# Patient Record
Sex: FEMALE | Race: WHITE | NOT HISPANIC OR LATINO | URBAN - METROPOLITAN AREA
[De-identification: names, ages, dates, MRNs, and addresses within clinical notes are randomized per-mention and may not be internally consistent; named-entity substitution may affect disease eponyms.]

---

## 2024-02-06 ENCOUNTER — OFFICE VISIT (OUTPATIENT)
Dept: PRIMARY CARE | Facility: CLINIC | Age: 64
End: 2024-02-06
Payer: COMMERCIAL

## 2024-02-06 VITALS
BODY MASS INDEX: 22.96 KG/M2 | DIASTOLIC BLOOD PRESSURE: 84 MMHG | SYSTOLIC BLOOD PRESSURE: 124 MMHG | RESPIRATION RATE: 16 BRPM | TEMPERATURE: 98.3 F | WEIGHT: 138 LBS | OXYGEN SATURATION: 99 % | HEART RATE: 77 BPM

## 2024-02-06 DIAGNOSIS — H61.23 BILATERAL IMPACTED CERUMEN: ICD-10-CM

## 2024-02-06 DIAGNOSIS — B96.89 ACUTE BACTERIAL SINUSITIS: Primary | ICD-10-CM

## 2024-02-06 DIAGNOSIS — Z79.2 PROPHYLACTIC ANTIBIOTIC: ICD-10-CM

## 2024-02-06 DIAGNOSIS — J01.90 ACUTE BACTERIAL SINUSITIS: Primary | ICD-10-CM

## 2024-02-06 PROCEDURE — 69209 REMOVE IMPACTED EAR WAX UNI: CPT | Performed by: NURSE PRACTITIONER

## 2024-02-06 PROCEDURE — 1036F TOBACCO NON-USER: CPT | Performed by: NURSE PRACTITIONER

## 2024-02-06 PROCEDURE — 99213 OFFICE O/P EST LOW 20 MIN: CPT | Performed by: NURSE PRACTITIONER

## 2024-02-06 RX ORDER — OFLOXACIN 3 MG/ML
5 SOLUTION AURICULAR (OTIC) 2 TIMES DAILY
Qty: 5 ML | Refills: 0 | Status: SHIPPED | OUTPATIENT
Start: 2024-02-06 | End: 2024-02-13

## 2024-02-06 RX ORDER — AZITHROMYCIN 250 MG/1
TABLET, FILM COATED ORAL
Qty: 6 TABLET | Refills: 0 | Status: SHIPPED | OUTPATIENT
Start: 2024-02-06 | End: 2024-02-11

## 2024-02-06 ASSESSMENT — ENCOUNTER SYMPTOMS
HEADACHES: 1
SINUS PRESSURE: 1

## 2024-02-06 NOTE — PROGRESS NOTES
Subjective   Patient ID: Marlen Dasilva is a 63 y.o. female who presents for Sinusitis.  Sinusitis  Episode onset: 10 days. Associated symptoms include congestion, headaches and sinus pressure.    Review of Systems   HENT:  Positive for congestion and sinus pressure.    Neurological:  Positive for headaches.   Objective   /84   Pulse 77   Temp 36.8 °C (98.3 °F)   Resp 16   Wt 62.6 kg (138 lb)   SpO2 99%   BMI 22.96 kg/m²   Patient ID: Marlen Dasilva is a 63 y.o. female.  Ear Cerumen Removal    Date/Time: 2/6/2024 11:52 AM    Performed by: SALAZAR Lopez  Authorized by: SALAZAR Lopez    Consent:     Consent obtained:  Verbal    Risks, benefits, and alternatives were discussed: yes      Alternatives discussed:  No treatment  Universal protocol:     Procedure explained and questions answered to patient or proxy's satisfaction: yes      Patient identity confirmed:  Verbally with patient  Procedure details:     Location:  L ear and R ear    Procedure type: irrigation    Post-procedure details:     Inspection:  Ear canal clear, bleeding and TM intact (small amount of bright red blood noted on right TM. No apparent perforation. Had discomfort on right during procedure that resolved after it was finished.)    Hearing quality:  Normal    Procedure completion:  Tolerated with difficulty  Physical Exam  Vitals reviewed.   Constitutional:       Appearance: Normal appearance.   HENT:      Head: Normocephalic.   Eyes:      Conjunctiva/sclera: Conjunctivae normal.   Cardiovascular:      Rate and Rhythm: Normal rate and regular rhythm.      Pulses: Normal pulses.   Pulmonary:      Breath sounds: Normal breath sounds.   Abdominal:      General: Bowel sounds are normal.      Palpations: Abdomen is soft.   Musculoskeletal:      Cervical back: Neck supple.      Right lower leg: No edema.      Left lower leg: No edema.   Skin:     General: Skin is warm and dry.   Neurological:      General: No focal  deficit present.      Mental Status: She is alert.   Psychiatric:         Mood and Affect: Mood normal.         Thought Content: Thought content normal.     Assessment/Plan   1. Acute bacterial sinusitis  azithromycin (Zithromax) 250 mg tablet      2. Bilateral impacted cerumen        3. Prophylactic antibiotic  ofloxacin (Floxin) 0.3 % otic solution    Ofloxacin otic drops ordered due to small amount of blood noted after right ear irrigation.      Increase oral fluids/water, at least eight 8-oz glasses/day.  Get plenty of rest.  Cepacol lozenges and/or Chloraseptic spray PRN for sore throat. Salt water gargle or broth for comfort.  Alternate Tylenol/Ibuprofen PRN for body aches and/or fever  Saline nasal spray PRN for rhinitis.  Guaifenessin/Guaifenissin DM PRN for cough  Flonase nasal spray.    Follow-up as needed.

## 2024-04-29 PROBLEM — R06.83 SNORING: Status: ACTIVE | Noted: 2024-04-29

## 2024-04-29 PROBLEM — R06.81 WITNESSED APNEIC SPELLS: Status: ACTIVE | Noted: 2024-04-29

## 2024-04-29 PROBLEM — R09.81 NASAL CONGESTION: Status: ACTIVE | Noted: 2024-04-29

## 2024-04-29 PROBLEM — E78.5 HLD (HYPERLIPIDEMIA): Status: ACTIVE | Noted: 2024-04-29

## 2024-04-29 PROBLEM — M47.812 CERVICAL SPONDYLOSIS WITHOUT MYELOPATHY: Status: ACTIVE | Noted: 2018-06-19

## 2024-04-29 PROBLEM — L90.0 LICHEN SCLEROSUS ET ATROPHICUS: Status: ACTIVE | Noted: 2023-04-20

## 2024-04-29 PROBLEM — H90.3 BILATERAL SENSORINEURAL HEARING LOSS: Status: ACTIVE | Noted: 2024-04-29

## 2024-04-29 PROBLEM — G47.00 INSOMNIA: Status: ACTIVE | Noted: 2024-04-29

## 2024-04-29 PROBLEM — G47.33 OBSTRUCTIVE SLEEP APNEA (ADULT) (PEDIATRIC): Status: ACTIVE | Noted: 2023-05-16

## 2024-04-29 RX ORDER — ACETAMINOPHEN 500 MG
5000 TABLET ORAL DAILY
COMMUNITY

## 2024-04-29 RX ORDER — GLUCOSAM/CHONDRO/HERB 149/HYAL 750-100 MG
TABLET ORAL
COMMUNITY

## 2024-04-29 RX ORDER — BISMUTH SUBSALICYLATE 262 MG
1 TABLET,CHEWABLE ORAL DAILY
COMMUNITY

## 2024-04-30 ENCOUNTER — OFFICE VISIT (OUTPATIENT)
Dept: PRIMARY CARE | Facility: CLINIC | Age: 64
End: 2024-04-30
Payer: COMMERCIAL

## 2024-04-30 ENCOUNTER — LAB (OUTPATIENT)
Dept: LAB | Facility: LAB | Age: 64
End: 2024-04-30
Payer: COMMERCIAL

## 2024-04-30 VITALS
BODY MASS INDEX: 23.32 KG/M2 | WEIGHT: 140 LBS | HEART RATE: 70 BPM | DIASTOLIC BLOOD PRESSURE: 78 MMHG | RESPIRATION RATE: 16 BRPM | SYSTOLIC BLOOD PRESSURE: 120 MMHG | OXYGEN SATURATION: 100 % | TEMPERATURE: 98 F | HEIGHT: 65 IN

## 2024-04-30 DIAGNOSIS — Z12.31 ENCOUNTER FOR SCREENING MAMMOGRAM FOR MALIGNANT NEOPLASM OF BREAST: Primary | ICD-10-CM

## 2024-04-30 DIAGNOSIS — Z00.00 HEALTH CARE MAINTENANCE: ICD-10-CM

## 2024-04-30 PROBLEM — R09.81 NASAL CONGESTION: Status: RESOLVED | Noted: 2024-04-29 | Resolved: 2024-04-30

## 2024-04-30 PROBLEM — R06.83 SNORING: Status: RESOLVED | Noted: 2024-04-29 | Resolved: 2024-04-30

## 2024-04-30 PROBLEM — R06.81 WITNESSED APNEIC SPELLS: Status: RESOLVED | Noted: 2024-04-29 | Resolved: 2024-04-30

## 2024-04-30 LAB
25(OH)D3 SERPL-MCNC: 64 NG/ML (ref 30–100)
ALBUMIN SERPL BCP-MCNC: 4.7 G/DL (ref 3.4–5)
ALP SERPL-CCNC: 59 U/L (ref 33–136)
ALT SERPL W P-5'-P-CCNC: 16 U/L (ref 7–45)
ANION GAP SERPL CALC-SCNC: 10 MMOL/L (ref 10–20)
AST SERPL W P-5'-P-CCNC: 16 U/L (ref 9–39)
BILIRUB SERPL-MCNC: 0.9 MG/DL (ref 0–1.2)
BUN SERPL-MCNC: 18 MG/DL (ref 6–23)
CALCIUM SERPL-MCNC: 9.7 MG/DL (ref 8.6–10.3)
CHLORIDE SERPL-SCNC: 104 MMOL/L (ref 98–107)
CHOLEST SERPL-MCNC: 292 MG/DL (ref 0–199)
CHOLESTEROL/HDL RATIO: 3.8
CO2 SERPL-SCNC: 29 MMOL/L (ref 21–32)
CREAT SERPL-MCNC: 0.78 MG/DL (ref 0.5–1.05)
EGFRCR SERPLBLD CKD-EPI 2021: 86 ML/MIN/1.73M*2
ERYTHROCYTE [DISTWIDTH] IN BLOOD BY AUTOMATED COUNT: 12.5 % (ref 11.5–14.5)
GLUCOSE SERPL-MCNC: 93 MG/DL (ref 74–99)
HCT VFR BLD AUTO: 43.2 % (ref 36–46)
HDLC SERPL-MCNC: 77.8 MG/DL
HGB BLD-MCNC: 14.2 G/DL (ref 12–16)
LDLC SERPL CALC-MCNC: 197 MG/DL
MCH RBC QN AUTO: 28.9 PG (ref 26–34)
MCHC RBC AUTO-ENTMCNC: 32.9 G/DL (ref 32–36)
MCV RBC AUTO: 88 FL (ref 80–100)
NON HDL CHOLESTEROL: 214 MG/DL (ref 0–149)
NRBC BLD-RTO: 0 /100 WBCS (ref 0–0)
PLATELET # BLD AUTO: 218 X10*3/UL (ref 150–450)
POTASSIUM SERPL-SCNC: 4.4 MMOL/L (ref 3.5–5.3)
PROT SERPL-MCNC: 6.7 G/DL (ref 6.4–8.2)
RBC # BLD AUTO: 4.91 X10*6/UL (ref 4–5.2)
SODIUM SERPL-SCNC: 139 MMOL/L (ref 136–145)
TRIGL SERPL-MCNC: 84 MG/DL (ref 0–149)
TSH SERPL-ACNC: 1.48 MIU/L (ref 0.44–3.98)
VIT B12 SERPL-MCNC: 750 PG/ML (ref 211–911)
VLDL: 17 MG/DL (ref 0–40)
WBC # BLD AUTO: 3.7 X10*3/UL (ref 4.4–11.3)

## 2024-04-30 PROCEDURE — 82306 VITAMIN D 25 HYDROXY: CPT

## 2024-04-30 PROCEDURE — 80053 COMPREHEN METABOLIC PANEL: CPT

## 2024-04-30 PROCEDURE — 1036F TOBACCO NON-USER: CPT | Performed by: INTERNAL MEDICINE

## 2024-04-30 PROCEDURE — 82607 VITAMIN B-12: CPT

## 2024-04-30 PROCEDURE — 85027 COMPLETE CBC AUTOMATED: CPT

## 2024-04-30 PROCEDURE — 99396 PREV VISIT EST AGE 40-64: CPT | Performed by: INTERNAL MEDICINE

## 2024-04-30 PROCEDURE — 84443 ASSAY THYROID STIM HORMONE: CPT

## 2024-04-30 PROCEDURE — 80061 LIPID PANEL: CPT

## 2024-04-30 PROCEDURE — 36415 COLL VENOUS BLD VENIPUNCTURE: CPT

## 2024-04-30 RX ORDER — MINERAL OIL
180 ENEMA (ML) RECTAL DAILY PRN
COMMUNITY

## 2024-04-30 ASSESSMENT — ENCOUNTER SYMPTOMS
FATIGUE: 0
EYE REDNESS: 0
DIARRHEA: 0
NAUSEA: 0
CONSTIPATION: 0
FREQUENCY: 0
BACK PAIN: 0
SHORTNESS OF BREATH: 0
DYSURIA: 0
WHEEZING: 0
EYE PAIN: 0
PSYCHIATRIC NEGATIVE: 1
EYE ITCHING: 0
ARTHRALGIAS: 0
COUGH: 0
SORE THROAT: 0
FEVER: 0
RHINORRHEA: 0
WEAKNESS: 0
UNEXPECTED WEIGHT CHANGE: 0
DIFFICULTY URINATING: 0
HEADACHES: 0
PALPITATIONS: 0
ABDOMINAL PAIN: 0

## 2024-04-30 ASSESSMENT — PATIENT HEALTH QUESTIONNAIRE - PHQ9
2. FEELING DOWN, DEPRESSED OR HOPELESS: NOT AT ALL
SUM OF ALL RESPONSES TO PHQ9 QUESTIONS 1 AND 2: 0
1. LITTLE INTEREST OR PLEASURE IN DOING THINGS: NOT AT ALL

## 2024-04-30 NOTE — PROGRESS NOTES
"Ryan Dasilva is a 63 y.o. female who presents for Annual Exam.    HPI   Influenza  Covid 2021  Shingrix 2021, 2022  Tdap 2016  Mammogram 4/2023  Pap 2020 GYN  Colonoscopy 2018 10 year dakhi  Bmi 23  Eye exam 24  Depression screen 24        Review of Systems   Constitutional:  Negative for fatigue, fever and unexpected weight change.   HENT:  Negative for congestion, ear pain, rhinorrhea and sore throat.    Eyes:  Negative for pain, redness and itching.   Respiratory:  Negative for cough, shortness of breath and wheezing.    Cardiovascular:  Negative for chest pain, palpitations and leg swelling.   Gastrointestinal:  Negative for abdominal pain, constipation, diarrhea and nausea.   Genitourinary:  Negative for difficulty urinating, dysuria and frequency.   Musculoskeletal:  Negative for arthralgias and back pain.   Allergic/Immunologic: Negative for environmental allergies, food allergies and immunocompromised state.   Neurological:  Negative for weakness and headaches.   Psychiatric/Behavioral: Negative.     All other systems reviewed and are negative.      Health Maintenance Due   Topic Date Due    Yearly Adult Physical  Never done    HIV Screening  Never done    MMR Vaccines (1 of 1 - Standard series) Never done    Hepatitis C Screening  Never done    RSV Pregnant patients and/or  patients aged 60+ years (1 - 1-dose 60+ series) Never done    COVID-19 Vaccine (4 - 2023-24 season) 09/01/2023    Cervical Cancer Screening  09/14/2023    Mammogram  04/26/2024       Objective   /78   Pulse 70   Temp 36.7 °C (98 °F)   Resp 16   Ht 1.651 m (5' 5\")   Wt 63.5 kg (140 lb)   SpO2 100%   BMI 23.30 kg/m²     Physical Exam  Vitals and nursing note reviewed.   Constitutional:       Appearance: Normal appearance.   HENT:      Head: Normocephalic.   Eyes:      Conjunctiva/sclera: Conjunctivae normal.      Pupils: Pupils are equal, round, and reactive to light.   Cardiovascular:      Rate and Rhythm: " Normal rate and regular rhythm.      Pulses: Normal pulses.      Heart sounds: Normal heart sounds.   Pulmonary:      Effort: Pulmonary effort is normal.      Breath sounds: Normal breath sounds.   Musculoskeletal:         General: No swelling.      Cervical back: Neck supple.   Skin:     General: Skin is warm and dry.   Neurological:      General: No focal deficit present.      Mental Status: She is oriented to person, place, and time.         Assessment/Plan   Problem List Items Addressed This Visit    None  Visit Diagnoses       Encounter for screening mammogram for malignant neoplasm of breast    -  Primary    Health care maintenance        Relevant Orders    CBC (Completed)    Comprehensive Metabolic Panel    Lipid Panel    Thyroid Stimulating Hormone    Vitamin B12    Vitamin D 25-Hydroxy,Total (for eval of Vitamin D levels)

## 2024-05-02 ENCOUNTER — TELEPHONE (OUTPATIENT)
Dept: PRIMARY CARE | Facility: CLINIC | Age: 64
End: 2024-05-02
Payer: COMMERCIAL

## 2024-05-02 NOTE — TELEPHONE ENCOUNTER
----- Message from Marlyn Singh DO sent at 5/2/2024  9:58 AM EDT -----  Call patient her labs look very good  Her chol has gone up from last year  Her cardiolvascular risk is still low but her ldl is 197  I would consider a statin med to decrease   Its a grey area the safest is to try a low dose vs cont healthy lifestyle   I am fine either way wanted to offer med  We certainly can recheck in 6 months and reeval   Price (Use Numbers Only, No Special Characters Or $): Venus Detail Level: Zone Treatment Number: 1 Post-Care Instructions: I reviewed with the patient in detail post-care instructions. Patient should stay away from the sun and wear sun protection until treated areas are fully healed. Consent was obtained, risks reviewed including but not limited to darker or lighter pigmentary change, bruising, and/or incomplete response.

## 2024-05-02 NOTE — RESULT ENCOUNTER NOTE
Call patient her labs look very good  Her chol has gone up from last year  Her cardiolvascular risk is still low but her ldl is 197  I would consider a statin med to decrease   Its a grey area the safest is to try a low dose vs cont healthy lifestyle   I am fine either way wanted to offer med  We certainly can recheck in 6 months and reeval

## 2024-05-02 NOTE — TELEPHONE ENCOUNTER
Pt made aware.  Stated she would like to discuss w/you in office visit d/t family hx.  Transferred to [facility] to schedule.

## 2024-05-29 ENCOUNTER — OFFICE VISIT (OUTPATIENT)
Dept: PRIMARY CARE | Facility: CLINIC | Age: 64
End: 2024-05-29
Payer: COMMERCIAL

## 2024-05-29 VITALS
BODY MASS INDEX: 23.32 KG/M2 | OXYGEN SATURATION: 100 % | DIASTOLIC BLOOD PRESSURE: 78 MMHG | TEMPERATURE: 98 F | HEART RATE: 68 BPM | HEIGHT: 65 IN | SYSTOLIC BLOOD PRESSURE: 128 MMHG | RESPIRATION RATE: 16 BRPM | WEIGHT: 140 LBS

## 2024-05-29 DIAGNOSIS — E78.01 FAMILIAL HYPERCHOLESTEROLEMIA: ICD-10-CM

## 2024-05-29 DIAGNOSIS — R00.2 HEART PALPITATIONS: Primary | ICD-10-CM

## 2024-05-29 PROCEDURE — 93000 ELECTROCARDIOGRAM COMPLETE: CPT | Performed by: INTERNAL MEDICINE

## 2024-05-29 PROCEDURE — 99214 OFFICE O/P EST MOD 30 MIN: CPT | Performed by: INTERNAL MEDICINE

## 2024-05-29 NOTE — PROGRESS NOTES
"Subjective   Marlen Dasilva is a 63 y.o. female who presents for Hyperlipidemia.    HPI   Lipids elevated per most recent lab work.  Family h/o heart disease.  Works  Wanting to discuss risks vs benefits w/medications.    Review of Systems    Health Maintenance Due   Topic Date Due    HIV Screening  Never done    MMR Vaccines (1 of 1 - Standard series) Never done    Hepatitis C Screening  Never done    RSV Pregnant patients and/or  patients aged 60+ years (1 - 1-dose 60+ series) Never done    COVID-19 Vaccine (4 - 2023-24 season) 09/01/2023    Mammogram  04/26/2024       Objective   /78   Pulse 68   Temp 36.7 °C (98 °F)   Resp 16   Ht 1.651 m (5' 5\")   Wt 63.5 kg (140 lb)   SpO2 100%   BMI 23.30 kg/m²     Physical Exam    Assessment/Plan   Problem List Items Addressed This Visit       HLD (hyperlipidemia)    Relevant Orders    CT cardiac scoring wo IV contrast    Referral to Cardiology     Other Visit Diagnoses       Heart palpitations    -  Primary    Relevant Orders    ECG 12 lead (Clinic Performed) (Completed)          Discussed risk vs benefit of lipid lowering therapy  While risk is low absolute number is so high  Rec pt strongly consider low dose statin to decrease risk   Pt understands  Reviewed cardiac ct, repeat  Will consult cards for second opinion   EKG done for palpitations, no changes         "

## 2024-07-12 ENCOUNTER — OFFICE VISIT (OUTPATIENT)
Dept: CARDIOLOGY | Facility: CLINIC | Age: 64
End: 2024-07-12
Payer: COMMERCIAL

## 2024-07-12 VITALS
WEIGHT: 139 LBS | BODY MASS INDEX: 23.16 KG/M2 | HEART RATE: 72 BPM | OXYGEN SATURATION: 98 % | DIASTOLIC BLOOD PRESSURE: 76 MMHG | SYSTOLIC BLOOD PRESSURE: 124 MMHG | HEIGHT: 65 IN

## 2024-07-12 DIAGNOSIS — E78.01 FAMILIAL HYPERCHOLESTEROLEMIA: ICD-10-CM

## 2024-07-12 PROCEDURE — 99213 OFFICE O/P EST LOW 20 MIN: CPT | Performed by: INTERNAL MEDICINE

## 2024-07-12 PROCEDURE — 99203 OFFICE O/P NEW LOW 30 MIN: CPT | Performed by: INTERNAL MEDICINE

## 2024-07-12 PROCEDURE — 1036F TOBACCO NON-USER: CPT | Performed by: INTERNAL MEDICINE

## 2024-07-12 ASSESSMENT — ENCOUNTER SYMPTOMS
DEPRESSION: 0
LOSS OF SENSATION IN FEET: 0
OCCASIONAL FEELINGS OF UNSTEADINESS: 0

## 2024-07-12 ASSESSMENT — PAIN SCALES - GENERAL: PAINLEVEL: 0-NO PAIN

## 2024-07-12 NOTE — PROGRESS NOTES
Name : Marlen Dasilva    : 1960   MRN : [id number]   ENC Date : 24     Reason for visit: Dyslipidemia    Assessment and Plan:  Dyslipidemia: Patient has a significantly elevated LDL.  Her CRP is normal.  Her coronary calcium score a few years ago was 0.  Her HDL is quite high.  She does not have a significant family history of coronary artery disease.  We reviewed multiple options.  We reviewed the treatments that I do not believe offer any benefit including fish oil and niacin as well as Zetia.  Her options are to continue to not take any medications and see what her coronary calcium does over time.  You could make an argument first annual CRP levels as well.  Other options include low-dose rosuvastatin or red yeast rice extract supplement.  Her coronary calcium score was denied by insurance as it was only done 3 years ago.  We will look into seeing if we can appeal that or she might opt to pay for it out-of-pocket.  As of right now she does not want to start any therapy but might consider the red yeast rice extract supplement.  Disp: We will check into her coronary calcium score denial and see if this can be appealed.  Otherwise she will follow-up with primary care physician.      HPI:  Patient is here to discuss her dyslipidemia.  She is asymptomatic.  She is quite active.  She has no symptoms.  She has no family history of early onset coronary disease as best we can tell.  Her father  at the age of 36 in his sleep.  No autopsy was done.  He had a toe injury/surgery prior to that and there was some suggestion that perhaps he had a pulmonary embolism.  Dyslipidemia runs in the family and 2 of her brothers are on statin therapy.  None of them however have had any clinical events.  Her EKG has always been normal.  She had a coronary calcium score checked 3 years ago which was 0.  A repeat coronary calcium score was ordered but was denied by insurance.  They typically deny the repeat study under 5  years.  There actually is no guidelines or science to tell us when it is appropriate to repeat a coronary calcium score.  She also had a advanced lipid panel done through [facility].  This included a high-sensitivity CRP which was normal.      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      cholecalciferol (Vitamin D3) 5,000 Units tablet Take 1 tablet (5,000 Units) by mouth once daily.      cysteine HCl (CYSTEINE, L-CYSTEINE, ORAL) Take 1 tab by mouth twice daily.      fexofenadine (Allegra) 180 mg tablet Take 1 tablet (180 mg) by mouth once daily as needed.      multivitamin tablet Take 1 tablet by mouth once daily.      omega 3-dha-epa-fish oil (Fish OiL) 1,000 mg (120 mg-180 mg) capsule Take 2 caps by mouth daily.       No current facility-administered medications on file prior to visit.       All:   Allergies   Allergen Reactions    House Dust Unknown    Nickel Rash and Swelling     Testing completed allergic to nickel and colbolt       Fam Hx:   Family History   Problem Relation Name Age of Onset    Hypertension Mother      Macular degeneration Mother      Heart attack Father         Soc Hx:   Social History     Socioeconomic History    Marital status:      Spouse name: Not on file    Number of children: Not on file    Years of education: Not on file    Highest education level: Not on file   Occupational History    Not on file   Tobacco Use    Smoking status: Never    Smokeless  "tobacco: Never   Vaping Use    Vaping status: Never Used   Substance and Sexual Activity    Alcohol use: Yes     Alcohol/week: 3.0 standard drinks of alcohol     Types: 3 Standard drinks or equivalent per week    Drug use: Not Currently    Sexual activity: Yes   Other Topics Concern    Not on file   Social History Narrative    Not on file     Social Determinants of Health     Financial Resource Strain: Not on file   Food Insecurity: Not on file   Transportation Needs: Not on file   Physical Activity: Not on file   Stress: Not on file   Social Connections: Not on file   Intimate Partner Violence: Not on file   Housing Stability: Not on file       VS: /76 (BP Location: Right arm, Patient Position: Sitting)   Pulse 72   Ht 1.651 m (5' 5\")   Wt 63 kg (139 lb)   SpO2 98%   BMI 23.13 kg/m²      Physical Exam  Vitals reviewed.   Cardiovascular:      Rate and Rhythm: Normal rate and regular rhythm.      Heart sounds: No murmur heard.  Pulmonary:      Effort: Pulmonary effort is normal. No respiratory distress.      Breath sounds: No wheezing or rales.   Abdominal:      General: Abdomen is flat. There is no distension.      Palpations: Abdomen is soft.   Musculoskeletal:      Right lower leg: No edema.      Left lower leg: No edema.   Skin:     General: Skin is warm and dry.   Neurological:      General: No focal deficit present.      Mental Status: She is alert and oriented to person, place, and time.   Psychiatric:         Mood and Affect: Mood normal.            Encounter Date: 05/29/24   ECG 12 lead (Clinic Performed)    Narrative    Nsr  No acute changes          Davion Lynn MD   "

## 2024-07-23 ENCOUNTER — TELEPHONE (OUTPATIENT)
Dept: PRIMARY CARE | Facility: CLINIC | Age: 64
End: 2024-07-23
Payer: COMMERCIAL

## 2024-07-23 NOTE — TELEPHONE ENCOUNTER
Pt called , left a vm requesting to speak to Massiel funk CPT code for Cardiac score testing. [phone].

## 2025-05-01 ENCOUNTER — APPOINTMENT (OUTPATIENT)
Dept: PRIMARY CARE | Facility: CLINIC | Age: 65
End: 2025-05-01
Payer: COMMERCIAL

## 2025-05-01 VITALS
DIASTOLIC BLOOD PRESSURE: 74 MMHG | RESPIRATION RATE: 16 BRPM | BODY MASS INDEX: 23.99 KG/M2 | HEART RATE: 72 BPM | WEIGHT: 144 LBS | OXYGEN SATURATION: 100 % | SYSTOLIC BLOOD PRESSURE: 116 MMHG | TEMPERATURE: 97.8 F | HEIGHT: 65 IN

## 2025-05-01 DIAGNOSIS — Z00.00 HEALTH CARE MAINTENANCE: ICD-10-CM

## 2025-05-01 DIAGNOSIS — Z12.31 ENCOUNTER FOR SCREENING MAMMOGRAM FOR MALIGNANT NEOPLASM OF BREAST: Primary | ICD-10-CM

## 2025-05-01 PROCEDURE — 1036F TOBACCO NON-USER: CPT | Performed by: INTERNAL MEDICINE

## 2025-05-01 PROCEDURE — 99396 PREV VISIT EST AGE 40-64: CPT | Performed by: INTERNAL MEDICINE

## 2025-05-01 PROCEDURE — 3008F BODY MASS INDEX DOCD: CPT | Performed by: INTERNAL MEDICINE

## 2025-05-01 ASSESSMENT — ENCOUNTER SYMPTOMS
COUGH: 0
FEVER: 0
FREQUENCY: 0
DYSURIA: 0
CONSTIPATION: 0
NAUSEA: 0
EYE REDNESS: 0
FATIGUE: 0
EYE PAIN: 0
ARTHRALGIAS: 0
WHEEZING: 0
HEADACHES: 0
SHORTNESS OF BREATH: 0
DIFFICULTY URINATING: 0
WEAKNESS: 0
DIARRHEA: 0
ABDOMINAL PAIN: 0
RHINORRHEA: 0
SORE THROAT: 0
BACK PAIN: 0
UNEXPECTED WEIGHT CHANGE: 0
PALPITATIONS: 0
EYE ITCHING: 0
PSYCHIATRIC NEGATIVE: 1

## 2025-05-01 ASSESSMENT — PATIENT HEALTH QUESTIONNAIRE - PHQ9
1. LITTLE INTEREST OR PLEASURE IN DOING THINGS: NOT AT ALL
SUM OF ALL RESPONSES TO PHQ9 QUESTIONS 1 AND 2: 0
2. FEELING DOWN, DEPRESSED OR HOPELESS: NOT AT ALL

## 2025-05-01 NOTE — PROGRESS NOTES
"Ryan Dasilva is a 64 y.o. female who presents for Annual Exam.    HPI   Influenza declines  Covid 2021  Shingrix 2021, 2022  Tdap 2016  Mammogram 7/2024  Dexa 2024  Pap 2024  Colonoscopy 2018 10 year  Bmi 23  Depression screen 25  Eye exam 24    Review of Systems   Constitutional:  Negative for fatigue, fever and unexpected weight change.   HENT:  Negative for congestion, ear pain, rhinorrhea and sore throat.    Eyes:  Negative for pain, redness and itching.   Respiratory:  Negative for cough, shortness of breath and wheezing.    Cardiovascular:  Negative for chest pain, palpitations and leg swelling.   Gastrointestinal:  Negative for abdominal pain, constipation, diarrhea and nausea.   Genitourinary:  Negative for difficulty urinating, dysuria and frequency.   Musculoskeletal:  Negative for arthralgias and back pain.   Allergic/Immunologic: Negative for environmental allergies, food allergies and immunocompromised state.   Neurological:  Negative for weakness and headaches.   Psychiatric/Behavioral: Negative.     All other systems reviewed and are negative.      Health Maintenance Due   Topic Date Due    Yearly Adult Physical  Never done    HIV Screening  Never done    MMR Vaccines (1 of 1 - Standard series) Never done    Hepatitis C Screening  Never done    Pneumococcal Vaccine (1 of 1 - PCV) Never done    COVID-19 Vaccine (4 - 2024-25 season) 09/01/2024       Objective   /74   Pulse 72   Temp 36.6 °C (97.8 °F)   Resp 16   Ht 1.651 m (5' 5\")   Wt 65.3 kg (144 lb)   SpO2 100%   BMI 23.96 kg/m²     Physical Exam  Vitals and nursing note reviewed.   Constitutional:       Appearance: Normal appearance.   HENT:      Head: Normocephalic.   Eyes:      Conjunctiva/sclera: Conjunctivae normal.      Pupils: Pupils are equal, round, and reactive to light.   Cardiovascular:      Rate and Rhythm: Normal rate and regular rhythm.      Pulses: Normal pulses.      Heart sounds: Normal heart sounds. "   Pulmonary:      Effort: Pulmonary effort is normal.      Breath sounds: Normal breath sounds.   Musculoskeletal:         General: No swelling.      Cervical back: Neck supple.   Skin:     General: Skin is warm and dry.   Neurological:      General: No focal deficit present.      Mental Status: She is oriented to person, place, and time.         Assessment/Plan   Problem List Items Addressed This Visit    None  Visit Diagnoses         Encounter for screening mammogram for malignant neoplasm of breast    -  Primary    Relevant Orders    BI mammo bilateral screening tomosynthesis      Health care maintenance        Relevant Orders    CBC    Comprehensive Metabolic Panel    Lipid Panel    Thyroid Stimulating Hormone    Vitamin B12

## 2025-05-16 ENCOUNTER — OFFICE VISIT (OUTPATIENT)
Dept: PRIMARY CARE | Facility: CLINIC | Age: 65
End: 2025-05-16
Payer: COMMERCIAL

## 2025-05-16 VITALS
RESPIRATION RATE: 16 BRPM | HEART RATE: 82 BPM | OXYGEN SATURATION: 98 % | TEMPERATURE: 97.5 F | BODY MASS INDEX: 23.63 KG/M2 | WEIGHT: 142 LBS | DIASTOLIC BLOOD PRESSURE: 84 MMHG | SYSTOLIC BLOOD PRESSURE: 132 MMHG

## 2025-05-16 DIAGNOSIS — M54.50 ACUTE RIGHT-SIDED LOW BACK PAIN, UNSPECIFIED WHETHER SCIATICA PRESENT: Primary | ICD-10-CM

## 2025-05-16 DIAGNOSIS — R11.2 NAUSEA AND VOMITING, UNSPECIFIED VOMITING TYPE: ICD-10-CM

## 2025-05-16 LAB
POC APPEARANCE, URINE: ABNORMAL
POC BILIRUBIN, URINE: NEGATIVE
POC BLOOD, URINE: ABNORMAL
POC COLOR, URINE: YELLOW
POC GLUCOSE, URINE: NEGATIVE MG/DL
POC KETONES, URINE: NEGATIVE MG/DL
POC LEUKOCYTES, URINE: NEGATIVE
POC NITRITE,URINE: NEGATIVE
POC PH, URINE: 5 PH
POC PROTEIN, URINE: ABNORMAL MG/DL
POC SPECIFIC GRAVITY, URINE: 1.02
POC UROBILINOGEN, URINE: 0.2 EU/DL

## 2025-05-16 PROCEDURE — 99213 OFFICE O/P EST LOW 20 MIN: CPT | Performed by: NURSE PRACTITIONER

## 2025-05-16 PROCEDURE — 1036F TOBACCO NON-USER: CPT | Performed by: NURSE PRACTITIONER

## 2025-05-16 PROCEDURE — 81002 URINALYSIS NONAUTO W/O SCOPE: CPT | Performed by: NURSE PRACTITIONER

## 2025-05-16 RX ORDER — CYCLOBENZAPRINE HCL 5 MG
5 TABLET ORAL NIGHTLY PRN
Qty: 10 TABLET | Refills: 0 | Status: SHIPPED | OUTPATIENT
Start: 2025-05-16 | End: 2025-05-26

## 2025-05-16 RX ORDER — ONDANSETRON 4 MG/1
4 TABLET, ORALLY DISINTEGRATING ORAL EVERY 8 HOURS PRN
Qty: 15 TABLET | Refills: 0 | Status: SHIPPED | OUTPATIENT
Start: 2025-05-16 | End: 2025-05-21

## 2025-05-16 ASSESSMENT — ENCOUNTER SYMPTOMS
FEVER: 0
DIARRHEA: 1
ACTIVITY CHANGE: 1
HIP PAIN: 1
ABDOMINAL PAIN: 1
APPETITE CHANGE: 1
COUGH: 0
NAUSEA: 1
SLEEP DISTURBANCE: 1
VOMITING: 1

## 2025-05-16 NOTE — PROGRESS NOTES
Subjective   Patient ID: Marlen Dasilva is a 64 y.o. female who presents for Hip Pain and Vomiting.    R Hip pain/ back pain  Worked out and played pickle ball yesterday for 3 hours  Has full ROM  4-5/10 pain sitting in office today  Yesterday pt states pain was 10/10  Hard to get comfortable  No numbness or tingling  Does not shoot down/radiate        Nausea and vomiting over night  Was bloated after dinner  Felt nauseated  Frequency  Mild burning with urination      OTC- tylenol for hip pain    Hip Pain   Incident onset: last night. The pain is present in the right hip.   Vomiting   Episode onset: last night. The emesis has an appearance of stomach contents. Associated symptoms include abdominal pain and diarrhea. Pertinent negatives include no coughing or fever. Associated symptoms comments: Bloating after eating  .        Review of Systems   Constitutional:  Positive for activity change and appetite change. Negative for fever.   HENT:  Negative for congestion.    Respiratory:  Negative for cough.    Gastrointestinal:  Positive for abdominal pain, diarrhea, nausea and vomiting.   Psychiatric/Behavioral:  Positive for sleep disturbance.        Objective   /84   Pulse 82   Temp 36.4 °C (97.5 °F)   Resp 16   Wt 64.4 kg (142 lb)   SpO2 98%   BMI 23.63 kg/m²     Physical Exam  Vitals reviewed.   Constitutional:       General: She is awake. She is not in acute distress.     Appearance: Normal appearance. She is well-developed and well-groomed. She is not ill-appearing or toxic-appearing.   HENT:      Head: Normocephalic.   Eyes:      Extraocular Movements: Extraocular movements intact.      Conjunctiva/sclera: Conjunctivae normal.      Pupils: Pupils are equal, round, and reactive to light.   Cardiovascular:      Rate and Rhythm: Normal rate and regular rhythm.      Pulses: Normal pulses.      Heart sounds: Normal heart sounds.   Pulmonary:      Effort: Pulmonary effort is normal.      Breath sounds:  Normal breath sounds.   Abdominal:      General: Abdomen is flat. Bowel sounds are normal. There is no distension.      Palpations: Abdomen is soft.      Tenderness: There is no abdominal tenderness. There is no right CVA tenderness, left CVA tenderness, guarding or rebound.   Musculoskeletal:         General: Normal range of motion.      Cervical back: Normal, normal range of motion and neck supple.      Thoracic back: Normal.      Lumbar back: Spasms present. No swelling, edema or tenderness. Normal range of motion. Negative right straight leg raise test and negative left straight leg raise test.   Skin:     General: Skin is warm and dry.      Capillary Refill: Capillary refill takes less than 2 seconds.   Neurological:      General: No focal deficit present.      Mental Status: She is alert and oriented to person, place, and time.   Psychiatric:         Mood and Affect: Mood normal.         Behavior: Behavior normal. Behavior is cooperative.       Assessment/Plan   Diagnoses and all orders for this visit:  Acute right-sided low back pain, unspecified whether sciatica present  -     cyclobenzaprine (Flexeril) 5 mg tablet; Take 1 tablet (5 mg) by mouth as needed at bedtime for muscle spasms for up to 10 days.  Nausea and vomiting, unspecified vomiting type  -     POCT UA (nonautomated) manually resulted  -     Urine Culture; Future  -     ondansetron ODT (Zofran-ODT) 4 mg disintegrating tablet; Dissolve 1 tablet (4 mg) in the mouth every 8 hours if needed for nausea or vomiting for up to 5 days.    IO UA inconclusive; Urine culture to be sent  Zofran given to help with nausea  Recommended BRAT/low residue diet x1-2 days advancing as tolerated  Increase hydration  Can take up to 600 mg of ibuprofen every 6-8 hours for back pain  Heat for 20 minutes several times a day  Gentle stretching for back/hip  Follow up with PCP if not improving over the next 2-3 days  ER for any numbness/tingling, loss of bowel or bladder,  fevers or worsening of symptoms

## 2025-05-18 LAB — BACTERIA UR CULT: NORMAL

## 2025-05-20 ENCOUNTER — TELEPHONE (OUTPATIENT)
Dept: PRIMARY CARE | Facility: CLINIC | Age: 65
End: 2025-05-20
Payer: COMMERCIAL

## 2025-05-20 NOTE — TELEPHONE ENCOUNTER
----- Message from La RICO sent at 5/20/2025  9:31 AM EDT -----  Lmtcb  ----- Message -----  From: SALAZAR Johnson  Sent: 5/20/2025   9:11 AM EDT  To: Do [facility] [place] [id number] Clinical Support Staff    Please let patient know her urine culture did not indicate a UTI. Encouraged to follow up with PCP if back pain persists.  ----- Message -----  From: La Natarajan CMA  Sent: 5/16/2025   9:27 AM EDT  To: SALAZAR Johnson

## 2025-06-02 LAB
ALBUMIN SERPL-MCNC: 4.8 G/DL (ref 3.6–5.1)
ALP SERPL-CCNC: 80 U/L (ref 37–153)
ALT SERPL-CCNC: 19 U/L (ref 6–29)
ANION GAP SERPL CALCULATED.4IONS-SCNC: 8 MMOL/L (CALC) (ref 7–17)
AST SERPL-CCNC: 20 U/L (ref 10–35)
BILIRUB SERPL-MCNC: 0.7 MG/DL (ref 0.2–1.2)
BUN SERPL-MCNC: 19 MG/DL (ref 7–25)
CALCIUM SERPL-MCNC: 9.7 MG/DL (ref 8.6–10.4)
CHLORIDE SERPL-SCNC: 103 MMOL/L (ref 98–110)
CHOLEST SERPL-MCNC: 287 MG/DL
CHOLEST/HDLC SERPL: 3.3 (CALC)
CO2 SERPL-SCNC: 28 MMOL/L (ref 20–32)
CREAT SERPL-MCNC: 0.82 MG/DL (ref 0.5–1.05)
EGFRCR SERPLBLD CKD-EPI 2021: 80 ML/MIN/1.73M2
ERYTHROCYTE [DISTWIDTH] IN BLOOD BY AUTOMATED COUNT: 12.1 % (ref 11–15)
GLUCOSE SERPL-MCNC: 98 MG/DL (ref 65–99)
HCT VFR BLD AUTO: 46.1 % (ref 35–45)
HDLC SERPL-MCNC: 86 MG/DL
HGB BLD-MCNC: 14.8 G/DL (ref 11.7–15.5)
LDLC SERPL CALC-MCNC: 181 MG/DL (CALC)
MCH RBC QN AUTO: 28.8 PG (ref 27–33)
MCHC RBC AUTO-ENTMCNC: 32.1 G/DL (ref 32–36)
MCV RBC AUTO: 89.9 FL (ref 80–100)
NONHDLC SERPL-MCNC: 201 MG/DL (CALC)
PLATELET # BLD AUTO: 252 THOUSAND/UL (ref 140–400)
PMV BLD REES-ECKER: 10.8 FL (ref 7.5–12.5)
POTASSIUM SERPL-SCNC: 4.4 MMOL/L (ref 3.5–5.3)
PROT SERPL-MCNC: 6.9 G/DL (ref 6.1–8.1)
RBC # BLD AUTO: 5.13 MILLION/UL (ref 3.8–5.1)
SODIUM SERPL-SCNC: 139 MMOL/L (ref 135–146)
TRIGL SERPL-MCNC: 88 MG/DL
TSH SERPL-ACNC: 2.17 MIU/L (ref 0.4–4.5)
VIT B12 SERPL-MCNC: 613 PG/ML (ref 200–1100)
WBC # BLD AUTO: 5.1 THOUSAND/UL (ref 3.8–10.8)

## 2025-06-06 ENCOUNTER — TELEPHONE (OUTPATIENT)
Dept: PRIMARY CARE | Facility: CLINIC | Age: 65
End: 2025-06-06
Payer: COMMERCIAL

## 2025-06-06 NOTE — TELEPHONE ENCOUNTER
----- Message from Marlyn Singh sent at 6/6/2025 12:18 PM EDT -----  Call patient her labs look good  Her chol is stable   I reviewed her extensive profile from November   Even though she has high ldl her particle size is favorable and we should continue to watch   Her good chol is excellent   ----- Message -----  From: Saw, Smartling Results In  Sent: 6/2/2025   9:59 PM EDT  To: Marlyn Singh, DO

## 2025-07-01 ENCOUNTER — APPOINTMENT (OUTPATIENT)
Dept: RADIOLOGY | Facility: CLINIC | Age: 65
End: 2025-07-01
Payer: COMMERCIAL

## 2026-05-01 ENCOUNTER — APPOINTMENT (OUTPATIENT)
Dept: PRIMARY CARE | Facility: CLINIC | Age: 66
End: 2026-05-01
Payer: COMMERCIAL